# Patient Record
Sex: MALE | Race: OTHER | HISPANIC OR LATINO | URBAN - METROPOLITAN AREA
[De-identification: names, ages, dates, MRNs, and addresses within clinical notes are randomized per-mention and may not be internally consistent; named-entity substitution may affect disease eponyms.]

---

## 2024-11-01 ENCOUNTER — EMERGENCY (EMERGENCY)
Facility: HOSPITAL | Age: 44
LOS: 1 days | Discharge: HOME | End: 2024-11-01
Attending: STUDENT IN AN ORGANIZED HEALTH CARE EDUCATION/TRAINING PROGRAM
Payer: SELF-PAY

## 2024-11-01 VITALS
HEART RATE: 120 BPM | TEMPERATURE: 98 F | SYSTOLIC BLOOD PRESSURE: 140 MMHG | DIASTOLIC BLOOD PRESSURE: 96 MMHG | WEIGHT: 149.91 LBS | RESPIRATION RATE: 20 BRPM | OXYGEN SATURATION: 98 %

## 2024-11-01 PROCEDURE — 99284 EMERGENCY DEPT VISIT MOD MDM: CPT

## 2024-11-01 PROCEDURE — 96374 THER/PROPH/DIAG INJ IV PUSH: CPT

## 2024-11-01 PROCEDURE — 99285 EMERGENCY DEPT VISIT HI MDM: CPT | Mod: 25

## 2024-11-01 PROCEDURE — 82962 GLUCOSE BLOOD TEST: CPT

## 2024-11-01 PROCEDURE — T1013: CPT

## 2024-11-01 RX ORDER — ONDANSETRON HYDROCHLORIDE 2 MG/ML
4 INJECTION, SOLUTION INTRAMUSCULAR; INTRAVENOUS ONCE
Refills: 0 | Status: COMPLETED | OUTPATIENT
Start: 2024-11-01 | End: 2024-11-01

## 2024-11-01 RX ADMIN — ONDANSETRON HYDROCHLORIDE 4 MILLIGRAM(S): 2 INJECTION, SOLUTION INTRAMUSCULAR; INTRAVENOUS at 15:19

## 2024-11-01 NOTE — ED PROVIDER NOTE - PHYSICAL EXAMINATION
General: NAD, altered  Head: NC, AT  EENT: EOMI, no scleral icterus  Cardiac: RRR, no apparent murmurs, no lower extremity edema  Respiratory: CTABL, no respiratory distress on 2LNC  Abdomen: soft, ND, NT, nonperitonitic  MSK/Vascular: full ROM, soft compartments, warm extremities, no external lacerations or ecchymosis  Neuro: opens eye to voice, no focal deficits sensation to light touch intact  Psych: calm, cooperative General: NAD, altered  Head: NC, AT  EENT: 2mm pupils equal, EOMI, no scleral icterus  Cardiac: RRR, no apparent murmurs, no lower extremity edema  Respiratory: CTABL, no respiratory distress on 2LNC  Abdomen: soft, ND, NT, nonperitonitic  MSK/Vascular: full ROM, soft compartments, warm extremities, no external lacerations or ecchymosis, 2+ peripheral pulses b/l  Neuro: opens eye to voice, no focal deficits sensation to light touch intact

## 2024-11-01 NOTE — ED ADULT NURSE NOTE - CHIEF COMPLAINT QUOTE
Pt BIBA accompanied by [employer]687 for combative behavior.  As per report pt admitted to drinking alcohol.  Pt became combative, spitting at EMS.  Pt sedated upon arrival.  Pt responds to verbal stimuli.  Pt received 250mg ketamine, 5 mg Versed IM  from EMS PTA.  Pt moved to critical care.  Placed in yellow gown.  Belongings collected, labeled and secured.

## 2024-11-01 NOTE — ED PROVIDER NOTE - PATIENT PORTAL LINK FT
You can access the FollowMyHealth Patient Portal offered by [facility] by registering at the following website: [email or web address]. By joining [employer]’s FollowMyHealth portal, you will also be able to view your health information using other applications (apps) compatible with our system.

## 2024-11-01 NOTE — ED PROVIDER NOTE - PROGRESS NOTE DETAILS
Topher ATTG pt now awake, is able to recount and endorse that he was drinking etoh  will be dced soon mtf Topher ATTG pt now up and awake standinb on both feet upset his clothes are cut will dc

## 2024-11-01 NOTE — ED ADULT NURSE NOTE - NSFALLRISKINTERV_ED_ALL_ED
Assistance OOB with selected safe patient handling equipment if applicable/Assistance with ambulation/Communicate fall risk and risk factors to all staff, patient, and family/Encourage patient to sit up slowly, dangle for a short time, stand at bedside before walking/Monitor gait and stability/Monitor for mental status changes and reorient to person, place, and time, as needed/Provide visual cue: yellow wristband, yellow gown, etc/Reinforce activity limits and safety measures with patient and family/Review medications for side effects contributing to fall risk/Toileting schedule using arm’s reach rule for commode and bathroom/Use of alarms - bed, stretcher, chair and/or video monitoring/Bed in lowest position, wheels locked, appropriate side rails in place/Call bell, personal items and telephone in reach/Instruct patient to call for assistance before getting out of bed/chair/stretcher/Non-slip footwear applied when patient is off stretcher/[place] to call system/Physically safe environment - no spills, clutter or unnecessary equipment/Purposeful Proactive Rounding/Room/bathroom lighting operational, light cord in reach/Unable to comprehend

## 2024-11-01 NOTE — ED PROVIDER NOTE - NSFOLLOWUPINSTRUCTIONS_ED_ALL_ED_FT
Alcohol intoxication occurs when a person no longer thinks clearly or functions well after drinking alcohol. This is also referred to as becoming impaired. Intoxication can occur with just one drink. The legal definition of alcohol intoxication depends on the amount of alcohol in the blood (blood alcohol concentration, VITA). VITA of 80–100 mg/dL or higher is commonly considered legally intoxicated. The level of impairment depends on:  The amount of alcohol the person had.  The person's age, gender, and weight.  How often the person drinks.  Whether the person has other medical conditions, such as diabetes, seizures, or a heart condition.  Alcohol intoxication can range from mild to severe. The condition can be dangerous, especially if the person:  Also took certain drugs or prescription medicines.  Drinks a large amount of alcohol in a short period of time (binge drinks).  For women, binge drinking is having four or more drinks at one time.  For men, binge drinking is having five or more drinks at one time.  If you or anyone around you appears intoxicated, speak up and act.    What are the causes?  This condition is caused by drinking alcohol.    What increases the risk?  The following factors may make you more likely to develop this condition:  Peer pressure in young adults.  Difficulty managing stress.  History of drug or alcohol abuse.  Family history of drug or alcohol abuse.  Combining alcohol with drugs.  Low body weight.  Binge drinking.  What are the signs or symptoms?  Symptoms of alcohol intoxication can vary from person to person. Symptoms can be mild, moderate, or severe.    Symptoms of mild alcohol intoxication may include:  Feeling relaxed or sleepy.  Having mild difficulty with coordination, speech, memory, or attention.  Symptoms of moderate alcohol intoxication may include:  Strong anger or extreme sadness.  Moderate difficulty with coordination, speech, memory, or attention.  Symptoms of severe alcohol intoxication may include:  Severe difficulty with coordination, speech, memory, or attention.  Passing out.  Vomiting.  Confusion.  Slow breathing.  Coma.  Intoxication can change quickly from mild to severe. It can cause coma or death, especially in people who do not drink alcohol often.    How is this diagnosed?  Your health care provider will ask you how much alcohol you drank and what kind you had. Intoxication may also be diagnosed based on:  Your symptoms and medical history.  A physical exam.  A blood test that measures VITA.  A smell of alcohol on your breath.  How is this treated?  Treatment for alcohol intoxication may include:  Being monitored in an emergency department, hospital, or treatment center until your VITA comes down and it is safe for you to go home.  IV fluids to prevent or treat loss of fluid in the body (dehydration).  Medicine to treat nausea or vomiting or to get rid of alcohol in the body.  Counseling about the dangers of using alcohol.  Treatment for substance use disorder.  Oxygen therapy or a breathing machine (ventilator).  Drinking alcohol for a long time can have long-term effects on your brain, heart, and digestive system. These effects can be serious and may also require treatment.    Follow these instructions at home:  A sign showing that a person should not drive.  Eating and drinking    A 12-ounce bottle of beer, a 5-ounce glass of wine, and a 1.5-ounce shot of hard liquor.  Do not drink alcohol if:  Your health care provider tells you not to drink.  You are pregnant, may be pregnant, or are planning to become pregnant.  You are under the legal drinking age, or under 21 years old in the [place]  You are taking medicines that should not be taken with alcohol.  You have a medical condition, and alcohol makes it worse.  You need to drive or perform activities that require you to be alert.  You have substance use disorder.  Ask your health care provider if alcohol is safe for you. If your health care provider allows you to drink alcohol, limit how much you have to:  0–1 drink a day for women who are not pregnant.  0–2 drinks a day for men.  Know how much alcohol is in your drink. In the [place] one drink equals one 12 oz bottle of beer (355 mL), one 5 oz glass of wine (148 mL), or one 1½ oz glass of hard liquor (44 mL).  Avoid drinking alcohol on an empty stomach.  Alcohol increases urination. It is important to stay hydrated and avoid caffeine.  Avoid drinking more than one drink per hour.  When having multiple drinks, drink water or a non-alcoholic beverage between alcoholic drinks.  General instructions    Take over-the-counter and prescription medicines only as told by your health care provider.  Do not drive after drinking any amount of alcohol. Plan for a designated  or another way to go home.  Have someone responsible stay with you while you are intoxicated. You should notbe left alone.  Contact a health care provider if:  You do not feel better after a few days.  You have problems at work, at school, or at home due to drinking.  Get help right away if:  You have any of the following:  Trouble staying awake.  Moderate to severe trouble with coordination, speech, memory, or attention.  You are told you may have had a seizure.  Vomiting bright red blood or material that looks like coffee grounds.  Bloody stool (feces). The blood may make your stool bright red, black, or tarry.  These symptoms may be an emergency. Get help right away. Call 911.  Do not wait to see if the symptoms will go away.  Do not drive yourself to the hospital.  Also, get help right away if:  You have thoughts about hurting yourself or others.  Take one of these steps if you feel like you may hurt yourself or others, or have thoughts about taking your own life:  Call 911.  Call the National Suicide Prevention Lifeline at 1-211.819.8770 or 818. This is open 24 hours a day.  Text the Crisis Text Line at 784152.  Summary  Alcohol intoxication occurs when a person no longer thinks clearly or functions well after drinking alcohol.  Ask your health care provider if alcohol is safe for you. If your health care provider allows you to drink alcohol, limit how much you have.  Contact your health care provider if drinking has caused you problems at work, school, or home.  Get help right away if you have thoughts about hurting yourself or others.  This information is not intended to replace advice given to you by your health care provider. Make sure you discuss any questions you have with your health care provider.

## 2024-11-01 NOTE — ED ADULT NURSE REASSESSMENT NOTE - NS ED NURSE REASSESS COMMENT FT1
Report given to Chase werner 2 rn. Pt brought to B4L by charge JONATHAN Hogan. Pt remains in stable condition.

## 2024-11-01 NOTE — ED ADULT TRIAGE NOTE - CHIEF COMPLAINT QUOTE
Pt BIBA accompanied by [employer]666 for combative behavior.  As per report pt admitted to drinking alcohol.  Pt became combative, spitting at EMS.  Pt sedated upon arrival.  Pt responds to verbal stimuli.  Pt received 250mg ketamine, 5 mg Versed IM  from EMS PTA.  Pt moved to critical care.  Placed in yellow gown.  Belongings collected, labeled and secured.

## 2024-11-01 NOTE — ED PROVIDER NOTE - ATTENDING CONTRIBUTION TO CARE
I performed a history and physical exam of the patient and discussed their management with the resident. I reviewed the resident's note and agree with the documented findings and plan of care, except as noted.. My medical decision making and observations are found above.   pt unable to give any hx received ketamin and valium iv placed incase increased agitation

## 2024-11-01 NOTE — ED PROVIDER NOTE - OBJECTIVE STATEMENT
44-year-old male unknown past medical history brought in by EMS and [place] [employer] for acute agitation in the setting of alcohol intoxication.  Patient was sitting in [employer] all morning,  store employees called police and after initially cooperating with leaving the property subsequently returned after PD left.  After PD and EMS came patient then ran into traffic, stopped and handcuffed.  Acutely agitated, spitting and biting.  Given 5 mg IM Versed and then to 50 mg IM ketamine approximately 10 to 15 minutes prior to arrival.   reported to have drank 6 [place]'s. EMS fingerstick glucose greater than 200.

## 2024-11-01 NOTE — ED PROVIDER NOTE - CLINICAL SUMMARY MEDICAL DECISION MAKING FREE TEXT BOX
43 y/o male brought in for acute agitation in the setting of intoxication (6 [place]'s) requiring 250mg IM Ketamine and 5mg IM Versed. Breathing well on NC with EtCo2 in 30s. Arousable to voice but likely in dissociative state. No external signs of trauma, will observe for sobriety and reassess.

## 2024-11-01 NOTE — ED ADULT NURSE NOTE - OBJECTIVE STATEMENT
Pt received drowsy s/p medicated with 250mg IM ketamine and 5mg IM versed. Escorted by EMS and [employer] in cuffs. Pt unable to provide further information at this time. As per [employer], pt was at [employer] and became aggressive. Pt placed in yellow gown with belongings secured. FS obtained, see results. Pt placed on . Respirations even & unlabored. NAD. Pt safety maintained.

## 2025-01-12 ENCOUNTER — EMERGENCY (EMERGENCY)
Facility: HOSPITAL | Age: 45
LOS: 1 days | Discharge: HOME | End: 2025-01-12
Attending: STUDENT IN AN ORGANIZED HEALTH CARE EDUCATION/TRAINING PROGRAM
Payer: SELF-PAY

## 2025-01-12 VITALS — WEIGHT: 220.02 LBS

## 2025-01-12 LAB
BASOPHILS # BLD AUTO: 0.02 K/UL — SIGNIFICANT CHANGE UP (ref 0–0.2)
BASOPHILS NFR BLD AUTO: 0.2 % — SIGNIFICANT CHANGE UP (ref 0–2)
EOSINOPHIL # BLD AUTO: 0 K/UL — SIGNIFICANT CHANGE UP (ref 0–0.5)
EOSINOPHIL NFR BLD AUTO: 0 % — SIGNIFICANT CHANGE UP (ref 0–6)
HCT VFR BLD CALC: 44.2 % — SIGNIFICANT CHANGE UP (ref 39–50)
HGB BLD-MCNC: 15.7 G/DL — SIGNIFICANT CHANGE UP (ref 13–17)
IMM GRANULOCYTES NFR BLD AUTO: 0.3 % — SIGNIFICANT CHANGE UP (ref 0–0.9)
LYMPHOCYTES # BLD AUTO: 1.21 K/UL — SIGNIFICANT CHANGE UP (ref 1–3.3)
LYMPHOCYTES # BLD AUTO: 9.1 % — LOW (ref 13–44)
MCHC RBC-ENTMCNC: 30.1 PG — SIGNIFICANT CHANGE UP (ref 27–34)
MCHC RBC-ENTMCNC: 35.5 G/DL — SIGNIFICANT CHANGE UP (ref 32–36)
MCV RBC AUTO: 84.7 FL — SIGNIFICANT CHANGE UP (ref 80–100)
MONOCYTES # BLD AUTO: 0.5 K/UL — SIGNIFICANT CHANGE UP (ref 0–0.9)
MONOCYTES NFR BLD AUTO: 3.8 % — SIGNIFICANT CHANGE UP (ref 2–14)
NEUTROPHILS # BLD AUTO: 11.48 K/UL — HIGH (ref 1.8–7.4)
NEUTROPHILS NFR BLD AUTO: 86.6 % — HIGH (ref 43–77)
PLATELET # BLD AUTO: 200 K/UL — SIGNIFICANT CHANGE UP (ref 150–400)
RBC # BLD: 5.22 M/UL — SIGNIFICANT CHANGE UP (ref 4.2–5.8)
RBC # FLD: 11.9 % — SIGNIFICANT CHANGE UP (ref 10.3–14.5)
WBC # BLD: 13.25 K/UL — HIGH (ref 3.8–10.5)
WBC # FLD AUTO: 13.25 K/UL — HIGH (ref 3.8–10.5)

## 2025-01-12 PROCEDURE — 72125 CT NECK SPINE W/O DYE: CPT | Mod: 26

## 2025-01-12 PROCEDURE — 99285 EMERGENCY DEPT VISIT HI MDM: CPT

## 2025-01-12 PROCEDURE — 70450 CT HEAD/BRAIN W/O DYE: CPT | Mod: 26

## 2025-01-12 RX ORDER — MIDAZOLAM HYDROCHLORIDE 1 MG/ML
5 INJECTION INTRAMUSCULAR; INTRAVENOUS ONCE
Refills: 0 | Status: DISCONTINUED | OUTPATIENT
Start: 2025-01-12 | End: 2025-01-12

## 2025-01-12 RX ORDER — HALOPERIDOL DECANOATE 50 MG/ML
5 INJECTION INTRAMUSCULAR ONCE
Refills: 0 | Status: COMPLETED | OUTPATIENT
Start: 2025-01-12 | End: 2025-01-12

## 2025-01-12 RX ORDER — SODIUM CHLORIDE 9 MG/ML
1000 INJECTION, SOLUTION INTRAVENOUS ONCE
Refills: 0 | Status: COMPLETED | OUTPATIENT
Start: 2025-01-12 | End: 2025-01-12

## 2025-01-12 RX ORDER — LORAZEPAM 1 MG/1
2 TABLET ORAL ONCE
Refills: 0 | Status: DISCONTINUED | OUTPATIENT
Start: 2025-01-12 | End: 2025-01-12

## 2025-01-12 RX ORDER — DIPHENHYDRAMINE HCL 25 MG
50 TABLET ORAL ONCE
Refills: 0 | Status: COMPLETED | OUTPATIENT
Start: 2025-01-12 | End: 2025-01-12

## 2025-01-12 RX ORDER — MIDAZOLAM HYDROCHLORIDE 1 MG/ML
2 INJECTION INTRAMUSCULAR; INTRAVENOUS ONCE
Refills: 0 | Status: DISCONTINUED | OUTPATIENT
Start: 2025-01-12 | End: 2025-01-12

## 2025-01-12 RX ORDER — SODIUM CHLORIDE 9 MG/ML
1000 INJECTION, SOLUTION INTRAMUSCULAR; INTRAVENOUS; SUBCUTANEOUS ONCE
Refills: 0 | Status: COMPLETED | OUTPATIENT
Start: 2025-01-12 | End: 2025-01-12

## 2025-01-12 RX ADMIN — MIDAZOLAM HYDROCHLORIDE 2 MILLIGRAM(S): 1 INJECTION INTRAMUSCULAR; INTRAVENOUS at 22:15

## 2025-01-12 RX ADMIN — Medication 50 MILLIGRAM(S): at 19:11

## 2025-01-12 RX ADMIN — LORAZEPAM 2 MILLIGRAM(S): 1 TABLET ORAL at 19:11

## 2025-01-12 RX ADMIN — SODIUM CHLORIDE 1000 MILLILITER(S): 9 INJECTION, SOLUTION INTRAVENOUS at 23:19

## 2025-01-12 RX ADMIN — MIDAZOLAM HYDROCHLORIDE 5 MILLIGRAM(S): 1 INJECTION INTRAMUSCULAR; INTRAVENOUS at 20:05

## 2025-01-12 RX ADMIN — MIDAZOLAM HYDROCHLORIDE 2 MILLIGRAM(S): 1 INJECTION INTRAMUSCULAR; INTRAVENOUS at 20:05

## 2025-01-12 RX ADMIN — SODIUM CHLORIDE 1000 MILLILITER(S): 9 INJECTION, SOLUTION INTRAMUSCULAR; INTRAVENOUS; SUBCUTANEOUS at 21:50

## 2025-01-12 RX ADMIN — HALOPERIDOL DECANOATE 5 MILLIGRAM(S): 50 INJECTION INTRAMUSCULAR at 19:11

## 2025-01-12 NOTE — ED PROVIDER NOTE - ATTENDING CONTRIBUTION TO CARE
I, Marin Downey MD, personally saw the patient with the resident, and completed the key components of the history and physical exam. I then discussed the management plan with the resident.  Patient brought in by EMS with concern for intoxicated behavior and being aggressive with people outside of a grocery store.  Patient unable to provide additional history at this time due to his aggressive behavior.  On exam he is yelling and spitting at people.  Appears intoxicated.  Does have abrasion to his head and old abrasions to his knees but no other trauma noted on exam.  He is found to be tachycardic here.  Concern for acute alcohol intoxication causing his agitated behavior.  With abrasion to head, will obtain CT head and cervical spine to evaluate for trauma.  Patient hypotensive here but these vital signs were taken after received multiple medications to assist with sedation given his agitated behavior.  Will resuscitate with IV fluids and reevaluate patient.

## 2025-01-12 NOTE — ED PROVIDER NOTE - PATIENT PORTAL LINK FT
You can access the FollowMyHealth Patient Portal offered by [facility] by registering at the following website: [email or web address]. By joining [employer]’s FollowMyHealth portal, you will also be able to view your health information using other applications (apps) compatible with our system.

## 2025-01-12 NOTE — ED ADULT TRIAGE NOTE - CHIEF COMPLAINT QUOTE
BIBEMS with [employer] after being found outside a super market being aggressive and violent. +ETOH. PT with lacertion to forehead. Security and ED attending called to bedside

## 2025-01-12 NOTE — ED PROVIDER NOTE - CLINICAL SUMMARY MEDICAL DECISION MAKING FREE TEXT BOX
01-Jan-2018 19:00
45-year-old male with past medical history of alcohol use disorder brought in by EMS by [facility] for aggressive behavior likely in the setting of acute alcohol intoxication outside of a grocery store.  Patient is agitated, hostile towards staff, attempting to spit on others and is not able to be redirected.  Requiring medications for management of agitation including Haldol, Ativan, Versed, and Benadryl.  Priority CT head called after agitation managed. No lacerations requiring intervention at this time.

## 2025-01-12 NOTE — ED PROVIDER NOTE - PHYSICAL EXAMINATION
General: aggressive, intoxicated  Head: NC, abrasion over forehead, no obvious lacerations  EENT: L periorbital ecchymosis without proptosis, EOMI, no scleral icterus  Cardiac: Tachy but regular, no apparent murmurs, no lower extremity edema  Respiratory: CTABL, no respiratory distress   Abdomen: soft, ND, NT, nonperitonitic  MSK/Vascular: full ROM, soft compartments, warm extremities, old b/l knee abrasions  Neuro: awake and alert, GCS 14, no focal deficits  Psych: agitated, hostile towards staff

## 2025-01-12 NOTE — ED PROVIDER NOTE - OBJECTIVE STATEMENT
45-year-old male (real name Ari Hanks  1980)  brought in by EMS by [facility] after reportedly being intoxicated and physically aggressive with others outside of a grocery store.  Patient not redirectable, attempting to spit on staff members and police officers, has some bleeding/abrasions on his scalp.

## 2025-01-12 NOTE — ED ADULT NURSE NOTE - OBJECTIVE STATEMENT
Patient presents to ED accompanied by [employer] officers, patient aggressive and  agitated, as per EMS and [employer] officer patient was reported to be aggressive toward shoppers at local supermarket, patient currently aggressive and agitated, placed in critical care area for safety, security at the bedside, laceration on forehead with mild bleeding, resp even/unlabored.

## 2025-01-12 NOTE — ED PROVIDER NOTE - PROGRESS NOTE DETAILS
Patient developed hypotension while in the ER but slowly improving with fluids.  Given that there is no other external evidence of trauma, as well as his tachycardia improving, I suspect this is more likely related to multiple sedative medications that he received rather than in the setting of trauma.  Hemoglobin 15.7.  Will continue to monitor vital signs and reassess for clinical sobriety.  Marin Downey MD. Called to bedside as patient is more awake, but noted to be irritable and not following instructions. On re-assessment with  Bladimir: patient is awake but states he just want to sleep. Patient not sure how he got toe Ed and refusing to answer additional questions, as er covered his head. General examination patient noted to moving all extremities with normal breath sounds and cardiac examination.  Labs are as pending. BP has improved, The hypotension likely secondary to medications given earlier. Called to be bedside as patient attempted to get into a chair near his bed. Mr. Riggs,  Staff witnessed the patient falling and landing on his buttocks. There was no head and neck injury. Patient not answering questions at this time. Blood noted on clothing and floor and appears secondary to IV being forcibly displaced when he fell. No obvious trauma and patient in no distress. Called to be bedside as patient attempted to get into a chair near his bed. Mr. Riggs,  Staff witnessed the patient falling and landing on his buttocks. There was no head and neck injury. Patient not answering questions at this time. Blood noted on clothing and floor and appears secondary to IV being forcibly displaced when he fell. No obvious trauma and patient in no distress.  Labs are as note. Patient refusing to cooperate for EKG.

## 2025-01-12 NOTE — ED PROVIDER NOTE - NSFOLLOWUPINSTRUCTIONS_ED_ALL_ED_FT
Intoxicación alcohólica  Alcohol Intoxication    La intoxicación alcohólica ocurre cuando dee persona ya no piensa con claridad ni se desempeña saniya (experimenta un deterioro) después de consumir bebidas alcohólicas. La intoxicación alcohólica puede ocurrir tan solo con dee copa. El definición legal de la intoxicación alcohólica depende de la cantidad de alcohol en la cate (concentración de alcohol en la cate, MARLO). Dee MARLO de 80 a 100 mg/dl o mayor se considera legalmente stephanie dee intoxicación alcohólica. El nivel de deterioro depende de lo siguiente:    La cantidad de alcohol que la persona bebió.  La edad, el sexo y el peso de la persona.  La frecuencia con la que la persona amber.  Si la persona tiene otras enfermedades, tales stephanie diabetes, convulsiones o dee afección cardíaca.    La intoxicación alcohólica puede variar de leve a grave. La afección puede ser peligrosa, especialmente si la persona:    También usa ciertas drogas ilegales y medicamentos recetados.  Amber dee gran cantidad de alcohol en un periodo corto de tiempo (consume alcohol compulsivamente).    En las mujeres, el consumo de alcohol compulsivo significa beber cuatro o más medidas de alcohol a la vez.  En los hombres, el consumo de alcohol compulsivo significa beber gabby o más medidas de alcohol a la vez.    Si usted o alguien a franks alrededor parece estar embriagado, diga algo y actúe.    ¿Cuáles son las causas?  La causa de esta afección es el consumo de alcohol.    ¿Qué incrementa el riesgo?  Los siguientes factores pueden hacer que sea más propenso a contraer esta afección:    Presión de los pares en los adultos jóvenes.  Dificultad para manejar el estrés.  Antecedentes de consumo excesivo de drogas o alcohol.  Combinación de alcohol con drogas.  Antecedentes familiares de consumo excesivo de alcohol o drogas.  Peso corporal bajo.  Consumo de alcohol compulsivo.    ¿Cuáles son los signos o síntomas?  Los síntomas de la intoxicación alcohólica pueden variar de dee persona a otra. Los síntomas pueden ser leves, moderados o graves.    Los síntomas de dee intoxicación alcohólica leve pueden incluir los siguientes:    Sensación de relajación o somnolencia.  Tener dee leve dificultad con la coordinación, el habla, la memoria o la atención.    Los síntomas de dee intoxicación alcohólica moderada pueden incluir los siguientes:    Emociones extremas, stephanie enojo o tristeza.  Tener dificultad con la coordinación, el habla, la memoria o la atención.    Los síntomas de dee intoxicación alcohólica grave pueden incluir los siguientes:    Tener dee seria dificultad con la coordinación, el habla, la memoria o la atención.  Desmayo.  Vómitos.  Confusión.  Respiración lenta.  Coma.    La intoxicación alcohólica puede cambiar rápidamente de leve a grave. Puede causar coma o la muerte, especialmente en las personas que no están expuestas al alcohol con frecuencia.    ¿Cómo se diagnostica?  El médico le preguntará la cantidad y el tipo de bebida alcohólica que bebió. La intoxicación también puede diagnosticarse en función de:    Los síntomas y los antecedentes médicos.  Un examen físico.  Un análisis de cate que mide la MARLO.  El olor a alcohol en la respiración.    ¿Cómo se trata?  El tratamiento para la intoxicación por alcohol puede incluir:    Ser controlado en un departamento de emergencias, hospital o centro de tratamiento hasta que la MARLO disminuya y sea seguro para usted regresar a franks casa.  Líquidos intravenosos (i.v.) para prevenir o tratar la pérdida de líquido en el cuerpo (deshidratación).  Medicamentos para tratar las náuseas o los vómitos, o para eliminar el alcohol del cuerpo.  Asesoramiento (intervención breve) sobre los peligros de consumir alcohol.  Tratamiento para el trastorno por el consumo de sustancias.  Oxigenoterapia o dee máquina para respirar (respirador).    La exposición a dina plazo (crónica) al alcohol puede tener efectos a dina plazo en el cerebro, el corazón y el sistema digestivo. Estos efectos pueden ser graves y pueden requerir tratamiento.    Siga estas indicaciones en franks casa:         Comida y bebida     No monika alcohol si:    Franks médico le indica no hacerlo.  Está embarazada, puede estar embarazada o está tratando de quedar embarazada.  No tiene la edad legal para beber (mayor de 21 años de edad en los [place]).  Está tomando medicamentos que no se deben juan francisco con alcohol.  Tiene dee afección médica y el alcohol la empeora.  Tiene que conducir o realizar actividades que requieren que esté alerta.  Tiene un trastorno por abuso de sustancias.  Pregúntele al médico si el alcohol es seguro para usted. Si el médico le permite beber alcohol, limite la cantidad que kamilah. Puede beber:    De 0 a 1 medida por día para las mujeres.   De 0 a 2 medidas por día para los hombres.     Esté atento a la cantidad de alcohol que hay en las bebidas que kamilah. En los [place], dee medida equivale a dee botella de cerveza de 12 oz (355 ml), un vaso de vino de 5 oz (148 ml) o un vaso de dee bebida alcohólica de humaira graduación de 1½ oz (44 ml).  Evite beber alcohol con el estómago vacío.  Manténgase hidratado. Monika suficiente líquido para mantener la orina de color amarillo pálido. Evite la cafeína, ya que puede deshidratarlo.  Evite consumir más de dee bebida alcohólica por hora.  Cuando monika más que dee medida de alcohol, monika agua o dee bebida sin alcohol entre las bebidas alcohólicas.        Indicaciones generales    Carlyss los medicamentos de venta angélica y los recetados solamente stephanie se lo haya indicado el médico.  No conduzca después de beber cualquier cantidad de alcohol. Organice un conductor designado u otra forma de volver a casa.  Pídale a alguna persona responsable que se quede con usted mientras está embriagado. No debería quedarse solo.  Concurra a todas las visitas de seguimiento stephanie se lo haya indicado el médico. [place] es importante.    Comuníquese con un médico si:  No mejora luego de algunos días.  Tiene problemas en el trabajo, la escuela o el hogar debido al consumo de alcohol.    Solicite ayuda de inmediato si:  Tiene alguno de los siguientes síntomas:    Grave dificultad con la coordinación, el habla, la memoria o la atención.  Dificultad para mantenerse despierto.  Confusión grave.  Dee convulsión.  Desvanecimiento.  Desmayos.  Vómitos con cate de color alfaro brillante o dee sustancia parecida a la borra del café.  Tiene cate en la materia fecal (heces). La cate puede hacer que las heces tengan color alfaro brillante, color diogo o aspecto alquitranado. También pueden tener mal olor.  Temblores al tratar de abandonar el hábito de beber.  Pensamientos acerca de lastimarse a sí mismo o a otras personas.    Si alguna vez siente que puede lastimarse o lastimar a otras personas, o tiene pensamientos de poner fin a franks allie, busque ayuda de inmediato. Puede dirigirse al servicio de emergencias más cercano o comunicarse con:    El servicio de emergencias de franks localidad (911 en [place]  Dee línea de asistencia al suicida y atención en crisis, stephanie National Suicide Prevention Lifeline (Línea Nacional de Prevención del Suicidio), al 1-227.350.2892. Está disponible las 24 horas del día.    Resumen  La intoxicación alcohólica ocurre cuando dee persona ya no piensa con claridad ni se desempeña saniya después de consumir bebidas alcohólicas.  Si el médico le dice que no corre riesgos al beber alcohol, limite franks consumo a no más de 1 medida al día si es ezekiel (no monika si está embarazada) y 2 medidas si es hombre. Dee medida equivale a 12 onzas de cerveza, 5 onzas de vino o 1½ onzas de bebidas alcohólicas de humaira graduación.  Comuníquese con el médico si el consumo de alcohol le ha causado problemas en el trabajo, en la escuela o en franks casa.  Busque ayuda de inmediato si tiene pensamientos acerca de lastimarse a usted mismo o a otras personas.    NOTAS ADICIONALES E INSTRUCCIONES    Please follow up with your Primary MD in 24-48 hr.  Seek immediate medical care for any new/worsening signs or symptoms.

## 2025-01-13 VITALS
RESPIRATION RATE: 20 BRPM | DIASTOLIC BLOOD PRESSURE: 55 MMHG | HEART RATE: 100 BPM | SYSTOLIC BLOOD PRESSURE: 105 MMHG | OXYGEN SATURATION: 94 % | TEMPERATURE: 98 F

## 2025-01-13 LAB
ALBUMIN SERPL ELPH-MCNC: 3.6 G/DL — SIGNIFICANT CHANGE UP (ref 3.3–5.2)
ALP SERPL-CCNC: 98 U/L — SIGNIFICANT CHANGE UP (ref 40–120)
ALT FLD-CCNC: 20 U/L — SIGNIFICANT CHANGE UP
ANION GAP SERPL CALC-SCNC: 13 MMOL/L — SIGNIFICANT CHANGE UP (ref 5–17)
AST SERPL-CCNC: 38 U/L — SIGNIFICANT CHANGE UP
BILIRUB SERPL-MCNC: 0.7 MG/DL — SIGNIFICANT CHANGE UP (ref 0.4–2)
BLD GP AB SCN SERPL QL: SIGNIFICANT CHANGE UP
BUN SERPL-MCNC: 12.1 MG/DL — SIGNIFICANT CHANGE UP (ref 8–20)
CALCIUM SERPL-MCNC: 7.7 MG/DL — LOW (ref 8.4–10.5)
CHLORIDE SERPL-SCNC: 104 MMOL/L — SIGNIFICANT CHANGE UP (ref 96–108)
CO2 SERPL-SCNC: 20 MMOL/L — LOW (ref 22–29)
CREAT SERPL-MCNC: 1.06 MG/DL — SIGNIFICANT CHANGE UP (ref 0.5–1.3)
EGFR: 89 ML/MIN/1.73M2 — SIGNIFICANT CHANGE UP
ETHANOL SERPL-MCNC: 193 MG/DL — HIGH (ref 0–9)
GAS PNL BLDV: SIGNIFICANT CHANGE UP
GLUCOSE SERPL-MCNC: 108 MG/DL — HIGH (ref 70–99)
POTASSIUM SERPL-MCNC: 5.3 MMOL/L — SIGNIFICANT CHANGE UP (ref 3.5–5.3)
POTASSIUM SERPL-SCNC: 5.3 MMOL/L — SIGNIFICANT CHANGE UP (ref 3.5–5.3)
PROT SERPL-MCNC: 6.1 G/DL — LOW (ref 6.6–8.7)
SODIUM SERPL-SCNC: 137 MMOL/L — SIGNIFICANT CHANGE UP (ref 135–145)
TROPONIN T, HIGH SENSITIVITY RESULT: 6 NG/L — SIGNIFICANT CHANGE UP (ref 0–51)

## 2025-01-13 PROCEDURE — 85610 PROTHROMBIN TIME: CPT

## 2025-01-13 PROCEDURE — 82435 ASSAY OF BLOOD CHLORIDE: CPT

## 2025-01-13 PROCEDURE — 82803 BLOOD GASES ANY COMBINATION: CPT

## 2025-01-13 PROCEDURE — 82330 ASSAY OF CALCIUM: CPT

## 2025-01-13 PROCEDURE — 80307 DRUG TEST PRSMV CHEM ANLYZR: CPT

## 2025-01-13 PROCEDURE — 93005 ELECTROCARDIOGRAM TRACING: CPT

## 2025-01-13 PROCEDURE — 85730 THROMBOPLASTIN TIME PARTIAL: CPT

## 2025-01-13 PROCEDURE — 86850 RBC ANTIBODY SCREEN: CPT

## 2025-01-13 PROCEDURE — 84295 ASSAY OF SERUM SODIUM: CPT

## 2025-01-13 PROCEDURE — T1013: CPT

## 2025-01-13 PROCEDURE — 85018 HEMOGLOBIN: CPT

## 2025-01-13 PROCEDURE — 82947 ASSAY GLUCOSE BLOOD QUANT: CPT

## 2025-01-13 PROCEDURE — 85025 COMPLETE CBC W/AUTO DIFF WBC: CPT

## 2025-01-13 PROCEDURE — 84132 ASSAY OF SERUM POTASSIUM: CPT

## 2025-01-13 PROCEDURE — 80053 COMPREHEN METABOLIC PANEL: CPT

## 2025-01-13 PROCEDURE — 96374 THER/PROPH/DIAG INJ IV PUSH: CPT

## 2025-01-13 PROCEDURE — 96372 THER/PROPH/DIAG INJ SC/IM: CPT | Mod: XU

## 2025-01-13 PROCEDURE — 86900 BLOOD TYPING SEROLOGIC ABO: CPT

## 2025-01-13 PROCEDURE — 83605 ASSAY OF LACTIC ACID: CPT

## 2025-01-13 PROCEDURE — 85014 HEMATOCRIT: CPT

## 2025-01-13 PROCEDURE — 93010 ELECTROCARDIOGRAM REPORT: CPT

## 2025-01-13 PROCEDURE — 36415 COLL VENOUS BLD VENIPUNCTURE: CPT

## 2025-01-13 PROCEDURE — 86901 BLOOD TYPING SEROLOGIC RH(D): CPT

## 2025-01-13 PROCEDURE — 84484 ASSAY OF TROPONIN QUANT: CPT

## 2025-01-13 PROCEDURE — 99285 EMERGENCY DEPT VISIT HI MDM: CPT | Mod: 25

## 2025-01-13 PROCEDURE — 70450 CT HEAD/BRAIN W/O DYE: CPT | Mod: MC

## 2025-01-13 PROCEDURE — 72125 CT NECK SPINE W/O DYE: CPT | Mod: MC

## 2025-01-13 PROCEDURE — 83735 ASSAY OF MAGNESIUM: CPT

## 2025-01-13 RX ORDER — SODIUM CHLORIDE 9 MG/ML
1000 INJECTION, SOLUTION INTRAVENOUS ONCE
Refills: 0 | Status: COMPLETED | OUTPATIENT
Start: 2025-01-13 | End: 2025-01-13

## 2025-01-13 RX ADMIN — SODIUM CHLORIDE 1000 MILLILITER(S): 9 INJECTION, SOLUTION INTRAVENOUS at 01:44

## 2025-01-13 NOTE — ED ADULT NURSE REASSESSMENT NOTE - NS ED NURSE REASSESS COMMENT FT1
Assumed care of pt from, previous RN. A&Ox4, RR even and unlabored. Skin is warm and dry. PT belongings returned. Vs WNL.  Zachary used.
Patient awake and drowsy,  attempting to get out of bed despite encouragement to stay in bed. MD Alba to bedside.  at bedside, patient states he " wants to sleep."
Patient removing cardiac monitor leads and spo2, despite encouragement, patient being non-complaint with monitoring system
Report received from Valerio RN at 2230. Pt placed on pulse ox, patient noted to be hypotensive, inserted IV and started NS 1000ml bolus as per verbal order from Dr Mays
delay in vitals due to aggressive behavior
pt arrived handcuffed by PD and with spit mask in place very aggressive with staff delay in care due to behavior pt currently resting in strecther awaiting CT
Patient fell attempting to get OOB to chair, patient did no headstrike, no apparent injuries. MD Alba at bedside

## 2025-01-13 NOTE — ED ADULT NURSE REASSESSMENT NOTE - NSFALLRISKINTERV_ED_ALL_ED
Assistance OOB with selected safe patient handling equipment if applicable/Assistance with ambulation/Communicate fall risk and risk factors to all staff, patient, and family/Monitor gait and stability/Monitor for mental status changes and reorient to person, place, and time, as needed/Provide visual cue: yellow wristband, yellow gown, etc/Reinforce activity limits and safety measures with patient and family/Toileting schedule using arm’s reach rule for commode and bathroom/Use of alarms - bed, stretcher, chair and/or video monitoring/Call bell, personal items and telephone in reach/Instruct patient to call for assistance before getting out of bed/chair/stretcher/Non-slip footwear applied when patient is off stretcher/[place] to call system/Physically safe environment - no spills, clutter or unnecessary equipment/Purposeful Proactive Rounding/Room/bathroom lighting operational, light cord in reach

## 2025-01-20 ENCOUNTER — EMERGENCY (EMERGENCY)
Facility: HOSPITAL | Age: 45
LOS: 1 days | Discharge: HOME | End: 2025-01-20
Attending: STUDENT IN AN ORGANIZED HEALTH CARE EDUCATION/TRAINING PROGRAM
Payer: SELF-PAY

## 2025-01-20 VITALS
DIASTOLIC BLOOD PRESSURE: 57 MMHG | OXYGEN SATURATION: 96 % | TEMPERATURE: 98 F | HEART RATE: 110 BPM | RESPIRATION RATE: 17 BRPM | SYSTOLIC BLOOD PRESSURE: 91 MMHG

## 2025-01-20 LAB
ALBUMIN SERPL ELPH-MCNC: 3.9 G/DL — SIGNIFICANT CHANGE UP (ref 3.3–5.2)
ALP SERPL-CCNC: 113 U/L — SIGNIFICANT CHANGE UP (ref 40–120)
ALT FLD-CCNC: 23 U/L — SIGNIFICANT CHANGE UP
ANION GAP SERPL CALC-SCNC: 13 MMOL/L — SIGNIFICANT CHANGE UP (ref 5–17)
AST SERPL-CCNC: 25 U/L — SIGNIFICANT CHANGE UP
BASOPHILS # BLD AUTO: 0.04 K/UL — SIGNIFICANT CHANGE UP (ref 0–0.2)
BASOPHILS NFR BLD AUTO: 0.6 % — SIGNIFICANT CHANGE UP (ref 0–2)
BILIRUB SERPL-MCNC: 0.8 MG/DL — SIGNIFICANT CHANGE UP (ref 0.4–2)
BUN SERPL-MCNC: 11.9 MG/DL — SIGNIFICANT CHANGE UP (ref 8–20)
CALCIUM SERPL-MCNC: 8.4 MG/DL — SIGNIFICANT CHANGE UP (ref 8.4–10.5)
CHLORIDE SERPL-SCNC: 104 MMOL/L — SIGNIFICANT CHANGE UP (ref 96–108)
CO2 SERPL-SCNC: 23 MMOL/L — SIGNIFICANT CHANGE UP (ref 22–29)
CREAT SERPL-MCNC: 1.01 MG/DL — SIGNIFICANT CHANGE UP (ref 0.5–1.3)
EGFR: 91 ML/MIN/1.73M2 — SIGNIFICANT CHANGE UP
EOSINOPHIL # BLD AUTO: 0.03 K/UL — SIGNIFICANT CHANGE UP (ref 0–0.5)
EOSINOPHIL NFR BLD AUTO: 0.5 % — SIGNIFICANT CHANGE UP (ref 0–6)
GLUCOSE SERPL-MCNC: 110 MG/DL — HIGH (ref 70–99)
HCT VFR BLD CALC: 45.8 % — SIGNIFICANT CHANGE UP (ref 39–50)
HGB BLD-MCNC: 16.7 G/DL — SIGNIFICANT CHANGE UP (ref 13–17)
IMM GRANULOCYTES NFR BLD AUTO: 0.5 % — SIGNIFICANT CHANGE UP (ref 0–0.9)
LACTATE SERPL-SCNC: 2.6 MMOL/L — HIGH (ref 0.5–2)
LACTATE SERPL-SCNC: 4.4 MMOL/L — CRITICAL HIGH (ref 0.5–2)
LYMPHOCYTES # BLD AUTO: 1.9 K/UL — SIGNIFICANT CHANGE UP (ref 1–3.3)
LYMPHOCYTES # BLD AUTO: 29.1 % — SIGNIFICANT CHANGE UP (ref 13–44)
MCHC RBC-ENTMCNC: 30.9 PG — SIGNIFICANT CHANGE UP (ref 27–34)
MCHC RBC-ENTMCNC: 36.5 G/DL — HIGH (ref 32–36)
MCV RBC AUTO: 84.8 FL — SIGNIFICANT CHANGE UP (ref 80–100)
MONOCYTES # BLD AUTO: 0.47 K/UL — SIGNIFICANT CHANGE UP (ref 0–0.9)
MONOCYTES NFR BLD AUTO: 7.2 % — SIGNIFICANT CHANGE UP (ref 2–14)
NEUTROPHILS # BLD AUTO: 4.07 K/UL — SIGNIFICANT CHANGE UP (ref 1.8–7.4)
NEUTROPHILS NFR BLD AUTO: 62.1 % — SIGNIFICANT CHANGE UP (ref 43–77)
PLATELET # BLD AUTO: 245 K/UL — SIGNIFICANT CHANGE UP (ref 150–400)
POTASSIUM SERPL-MCNC: 4 MMOL/L — SIGNIFICANT CHANGE UP (ref 3.5–5.3)
POTASSIUM SERPL-SCNC: 4 MMOL/L — SIGNIFICANT CHANGE UP (ref 3.5–5.3)
PROT SERPL-MCNC: 6.5 G/DL — LOW (ref 6.6–8.7)
RBC # BLD: 5.4 M/UL — SIGNIFICANT CHANGE UP (ref 4.2–5.8)
RBC # FLD: 12.1 % — SIGNIFICANT CHANGE UP (ref 10.3–14.5)
SODIUM SERPL-SCNC: 139 MMOL/L — SIGNIFICANT CHANGE UP (ref 135–145)
WBC # BLD: 6.54 K/UL — SIGNIFICANT CHANGE UP (ref 3.8–10.5)
WBC # FLD AUTO: 6.54 K/UL — SIGNIFICANT CHANGE UP (ref 3.8–10.5)

## 2025-01-20 PROCEDURE — 93010 ELECTROCARDIOGRAM REPORT: CPT

## 2025-01-20 PROCEDURE — 99285 EMERGENCY DEPT VISIT HI MDM: CPT

## 2025-01-20 PROCEDURE — 70450 CT HEAD/BRAIN W/O DYE: CPT | Mod: 26

## 2025-01-20 RX ORDER — HALOPERIDOL DECANOATE 50 MG/ML
5 INJECTION INTRAMUSCULAR ONCE
Refills: 0 | Status: COMPLETED | OUTPATIENT
Start: 2025-01-20 | End: 2025-01-20

## 2025-01-20 RX ORDER — DIPHENHYDRAMINE HCL 25 MG
50 TABLET ORAL ONCE
Refills: 0 | Status: COMPLETED | OUTPATIENT
Start: 2025-01-20 | End: 2025-01-20

## 2025-01-20 RX ORDER — MIDAZOLAM HYDROCHLORIDE 1 MG/ML
7.5 INJECTION INTRAMUSCULAR; INTRAVENOUS ONCE
Refills: 0 | Status: DISCONTINUED | OUTPATIENT
Start: 2025-01-20 | End: 2025-01-20

## 2025-01-20 RX ORDER — SODIUM CHLORIDE 9 MG/ML
1000 INJECTION, SOLUTION INTRAMUSCULAR; INTRAVENOUS; SUBCUTANEOUS ONCE
Refills: 0 | Status: DISCONTINUED | OUTPATIENT
Start: 2025-01-20 | End: 2025-01-28

## 2025-01-20 RX ORDER — LORAZEPAM 1 MG/1
2 TABLET ORAL ONCE
Refills: 0 | Status: DISCONTINUED | OUTPATIENT
Start: 2025-01-20 | End: 2025-01-20

## 2025-01-20 RX ORDER — SODIUM CHLORIDE 9 MG/ML
1000 INJECTION, SOLUTION INTRAMUSCULAR; INTRAVENOUS; SUBCUTANEOUS ONCE
Refills: 0 | Status: COMPLETED | OUTPATIENT
Start: 2025-01-20 | End: 2025-01-20

## 2025-01-20 RX ADMIN — HALOPERIDOL DECANOATE 5 MILLIGRAM(S): 50 INJECTION INTRAMUSCULAR at 12:44

## 2025-01-20 RX ADMIN — LORAZEPAM 2 MILLIGRAM(S): 1 TABLET ORAL at 12:44

## 2025-01-20 RX ADMIN — Medication 50 MILLIGRAM(S): at 12:44

## 2025-01-20 RX ADMIN — LORAZEPAM 2 MILLIGRAM(S): 1 TABLET ORAL at 12:50

## 2025-01-20 RX ADMIN — MIDAZOLAM HYDROCHLORIDE 7.5 MILLIGRAM(S): 1 INJECTION INTRAMUSCULAR; INTRAVENOUS at 13:00

## 2025-01-20 RX ADMIN — SODIUM CHLORIDE 1000 MILLILITER(S): 9 INJECTION, SOLUTION INTRAMUSCULAR; INTRAVENOUS; SUBCUTANEOUS at 13:32

## 2025-01-20 NOTE — ED PROVIDER NOTE - PROGRESS NOTE3
Stable. Review of Systems:  CONSTITUTIONAL: No fever  SKIN: No rash  HEMATOLOGIC: No abnormal bleeding   EYES: No eye pain, No blurred vision  ENT: No sore throat, No neck pain   RESPIRATORY: No shortness of breath, No cough  CARDIAC: No chest pain, No palpitations  GI: No abdominal pain, No nausea, No vomiting   : No dysuria, frequency, hematuria.   MUSCULOSKELETAL: No joint paint, No swelling, No back pain  NEUROLOGIC: No numbness, No focal weakness, No headache, No dizziness  PSYCH: Patient denies SI/HI, denies any self-harm attempt or OD, and denies AV hallucinations.   All other systems negative, unless specified in HPI

## 2025-01-20 NOTE — ED ADULT NURSE NOTE - OBJECTIVE STATEMENT
pt arrives w/ [employer] agitated, aggressive and combative with staff and ems. pt unable to be directed despite multiple de escalation techqnies. md at bedside, pt medicated for safety. pt changed into yellow gown. placed on cardiac monitor and . pt under arrest w/ [employer]

## 2025-01-20 NOTE — ED ADULT NURSE NOTE - NS ED NURSE IV DC DT

## 2025-01-20 NOTE — ED PROVIDER NOTE - PATIENT PORTAL LINK FT
You can access the FollowMyHealth Patient Portal offered by [facility] by registering at the following website: [email or web address]. By joining [facility]’s FollowMyHealth portal, you will also be able to view your health information using other applications (apps) compatible with our system.

## 2025-01-20 NOTE — ED ADULT NURSE NOTE - NSFALLRISKINTERV_ED_ALL_ED
Assistance OOB with selected safe patient handling equipment if applicable/Communicate fall risk and risk factors to all staff, patient, and family/Encourage patient to sit up slowly, dangle for a short time, stand at bedside before walking/Provide visual cue: yellow wristband, yellow gown, etc/Reinforce activity limits and safety measures with patient and family/Review medications for side effects contributing to fall risk/Toileting schedule using arm’s reach rule for commode and bathroom/Call bell, personal items and telephone in reach/Instruct patient to call for assistance before getting out of bed/chair/stretcher/Non-slip footwear applied when patient is off stretcher/[place] to call system/Physically safe environment - no spills, clutter or unnecessary equipment/Purposeful Proactive Rounding/Room/bathroom lighting operational, light cord in reach

## 2025-01-20 NOTE — ED ADULT NURSE NOTE - NS ED NOTE  TALK SOMEONE YN
No GEN: Pt in NAD, well appearing, A&O x3.  PSYCH: Affect appropriate.  HEAD: Head NCAT.  Neck supple FROM  EYES: Sclera white w/o injection. PERRLA, EOMI.  ENT: No nasal d/c; sinuses non-ttp; uvula midline, no exudates, MMM.  RESP: b/l crackles w/ expiratory wheezing.  CARDIAC: RRR, clear distinct S1, S2, no appreciable murmurs.  ABD: Abdomen soft, non-tender. No CVAT b/l.  VASC: 2+ radial pulses equal b/l. No edema or calf tenderness.  SKIN: No rashes, lesions, or ecchymoses on the trunk.

## 2025-01-20 NOTE — ED PROVIDER NOTE - CLINICAL SUMMARY MEDICAL DECISION MAKING FREE TEXT BOX
Jayceejackson ATTG   51-year-old male unknown name chief complaint of altered mental status/agitation.  Was found via the police after he went up to them and attacked them.  Patient smoked of alcohol per the officers at bedside.  Apparently he has a history of being aggressive with drinking.  Patient was very aggressive with police has a spit mask over his face    With  at bedside patient is accusing people of stealing his money and needed to go to [facility].    GENERAL: very aggressive   HEENT: no nystagmus noted   LUNGS: non labored breathing   ABDOMEN non distended   EXT: No edema, no calf tenderness, no deformities.  NEURO: A&Ox1-2 Moving all extremities..  PSYCH:  aggressive     Given history and physical exam will need sedation and further workup given unclear ultimately/CT allergy but probable alcohol given patient smells of EtOH

## 2025-01-20 NOTE — ED PROVIDER NOTE - PROGRESS NOTE DETAILS
Randy ATTG   For the safety of the patient and for staff patient required sedation given he was very physical and spitting at staff. Randy ATTG   Patient began to calm down after receiving center half midazolam IM plan for blood work etiology of agitation assessment Randy ATTG pt now under arrest, still resting w/o any disturbances     pt under arrest for hitting officer Randy ATTG pt ambualting will dc pt medically cleared       Upon reassessment patient has some old bruising underlying the eye is patient denies having any changes to his vision and is nontender.  Patient states he fell many days ago.    Patient tolerated walking by himself plan for discharge and police custody

## 2025-01-20 NOTE — ED ADULT TRIAGE NOTE - CHIEF COMPLAINT QUOTE
PT BIBA for intox and aggressive behavior. PT approached [employer] became agitated kicked officer. PT handcuffed and spit guard in place. Aggressive in triage. yelling and attempting to jump from stretcher. Security and [employer] at bedside

## 2025-01-20 NOTE — ED PROVIDER NOTE - NSFOLLOWUPINSTRUCTIONS_ED_ALL_ED_FT
LO QUE NECESITA SABER:    ¿Qué es el abuso de alcohol?El abuso de alcohol significa que usted tatyana más de los límites diarios o semanales recomendados. Usted puede estar bebiendo alcohol regularmente o bebiendo grandes cantidades en un corto período (atracones de bebida). Usted sigue tomando, aunque esto le cause problemas legales, laborales o con mae relaciones.    ¿Qué necesito saber acerca de los límites recomendados de alcohol?Ashley bebida se define neeta 12 onzas (oz) de cerveza, 5 onzas de vino o 1.5 onzas de licor, neeta el whisky.    Los hombres de 21 a 64 añosdeberían limitar el consumo de alcohol a 2 tragos al día. No tome más de 4 bebidas por día o más de 14 en ashley semana.    Todos los hombres y las mujeres de 65 años o másdeberían limitar el consumo de alcohol a 1 trago por día. No tome más de 3 bebidas por día o más de 7 en ashley semana. No consuma bebidas alcohólicas si está embarazada.  ¿Cuáles son los signos y síntomas del abuso de alcohol?    Pérdida de interés en mae actividades, trabajo y labor escolar    Esconder alcohol o beber en privado    Depresión o sentimiento de culpa por beber    Pensamientos constantes acerca del alcohol    Beber por la mañana para aliviar los efectos de la resaca    No poder controlar la cantidad que se juantia    Inquietud, comportamiento errático y violento  ¿Qué problemas de jessica puede causar el abuso del alcohol?    Cáncer en hígado, páncreas, estómago, colon, riñón o mamas    Derrame cerebral o ataque cardíaco    Enfermedad hepática, renal o pulmonar    Desmayos, pérdida de memoria, daño cerebral o demencia    Diabetes, problemas del sistema inmunológico o deficiencia de tiamina (vitamina B1)    Problemas para usted y barriga bebé si tatyana cesar el embarazo  ¿Cómo se trata el abuso del alcohol?El tratamiento lo va a ayudar a comprender la razón por la cual usted abusa del alcohol. Los consejeros y terapeutas le van a proveer apoyo y lo van a ayudar a encontrar formas de afrontamiento en vez de davonte. Usted podría necesitar tratamiento con internación para proveerle un ambiente controlado. Usted podría necesitar tratamiento ambulatorio después de que barriga tratamiento hospitalario haya sido completado.    La desintoxicaciónes un programa utilizado para eliminar el alcohol de barriga cuerpo. Cesar la desintoxicación se administran medicamentos para ayudar a evitar los síntomas de abstinencia que se presentan cuando se suspende el consumo de alcohol.    La terapia de intervención brevelo ayuda a pensar de manera diferente sobre barriga consumo de alcohol. Un médico lo ayuda a fijar metas para disminuir barriga consumo de bebidas alcohólicas. La terapia puede continuar después de que sale del hospital.    Suplementos vitamínicos, neeta B1, pueden ser necesarios. El consumo de alcohol puede dificultar la capacidad del organismo de absorber suficiente vitamina B1. Es posible que le den vitamina B1 si barriga nivel es bajo. También se administra para prevenir el daño cerebral por consumo de alcohol.  ¿Qué puedo hacer para manejar mi abuso de alcohol?    Trabaje con los médicos en los objetivos para beber menos.[place] puede ayudar a prevenir problemas de jessica. Por ejemplo, puede empezar por planificar barriga consumo semanal de alcohol. Será más fácil davonte menos bebidas si planifica con antelación.    Cuando virgilio alcohol, acompáñelo con comida.La comida evitará que el alcohol entre en barriga sistema demasiado rápido. Coma antes de davonte barriga primera bebida alcohólica.    Calcule con cuidado el tiempo de mae bebidas.No tome más de ashley bebida por hora. [place] líquido, neeta agua, café o un refresco, entre las bebidas alcohólicas.    No maneje si ha tomado alcohol.Planifique con antelación para tener un viaje seguro a casa. Asegúrese de que alguien que no haya estado bebiendo lo pueda llevar a casa. Planifique el uso de un taxi u otro servicio de transporte, si lo necesita.    No virgilio alcohol si está tomando ju medicamento.El alcohol es peligroso cuando se combina con ciertos medicamentos, neeta acetaminofeno o un medicamento para la presión arterial. Hable con barriga médico acerca de todos los medicamentos que está tomando.  ¿Dónde puedo obtener apoyo y más información?    Alcoholics Anonymous  Web Address: http://www.aa.org  Substance Abuse and Mental Health Services Administration ([facility])  [address]  [place] [address]  Web Address: http://www.samhsa.gov or https://dpt2.samhsa.gov/treatment/  Llame al número local de emergencias (911 en los [place]), o pídale a alguien que llame si:    Tiene dolor en el pecho o dificultad para respirar de forma repentina.    Usted quiere lastimarse o lastimar a otros.    Usted sufre ashley convulsión.  ¿Cuándo liana buscar atención inmediatamente?    Usted no puede dejar de vomitar o vomita red.    ¿Cuándo liana llamar a mi médico?    Usted tiene alucinaciones (ve o escucha cosas que no son reales).    Usted tiene preguntas o inquietudes acerca de barriga condición o cuidado.  ACUERDOS SOBRE BARRIGA CUIDADO:    Usted tiene el derecho de ayudar a planear barriga cuidado. Aprenda todo lo que pueda sobre barriga condición y neeta darle tratamiento. Discuta mae opciones de tratamiento con mae médicos para decidir el cuidado que usted desea recibir. Usted siempre tiene el derecho de rechazar el tratamiento.

## 2025-01-21 VITALS
RESPIRATION RATE: 20 BRPM | SYSTOLIC BLOOD PRESSURE: 132 MMHG | DIASTOLIC BLOOD PRESSURE: 87 MMHG | HEART RATE: 99 BPM | OXYGEN SATURATION: 98 % | TEMPERATURE: 99 F

## 2025-01-21 PROCEDURE — 93005 ELECTROCARDIOGRAM TRACING: CPT

## 2025-01-21 PROCEDURE — 70450 CT HEAD/BRAIN W/O DYE: CPT | Mod: MC

## 2025-01-21 PROCEDURE — T1013: CPT

## 2025-01-21 PROCEDURE — 85025 COMPLETE CBC W/AUTO DIFF WBC: CPT

## 2025-01-21 PROCEDURE — 83605 ASSAY OF LACTIC ACID: CPT

## 2025-01-21 PROCEDURE — 96372 THER/PROPH/DIAG INJ SC/IM: CPT

## 2025-01-21 PROCEDURE — 99285 EMERGENCY DEPT VISIT HI MDM: CPT | Mod: 25

## 2025-01-21 PROCEDURE — 36415 COLL VENOUS BLD VENIPUNCTURE: CPT

## 2025-01-21 PROCEDURE — 80053 COMPREHEN METABOLIC PANEL: CPT

## 2025-01-23 DIAGNOSIS — R46.89 OTHER SYMPTOMS AND SIGNS INVOLVING APPEARANCE AND BEHAVIOR: ICD-10-CM

## 2025-01-23 DIAGNOSIS — R41.82 ALTERED MENTAL STATUS, UNSPECIFIED: ICD-10-CM

## 2025-01-23 DIAGNOSIS — S09.93XA UNSPECIFIED INJURY OF FACE, INITIAL ENCOUNTER: ICD-10-CM

## 2025-01-23 DIAGNOSIS — R45.1 RESTLESSNESS AND AGITATION: ICD-10-CM
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
## 2025-04-14 NOTE — ED ADULT NURSE NOTE - IN ACCORDANCE WITH [place] LAW, WE OFFER EVERY PATIENT A HEPATITIS C TEST. WOULD YOU LIKE TO BE TESTED TODAY?
- Blood pressure was elevated- Will increase amlodipine 10 mg daily - Goal for blood pressure <115/75 mmHg.   - Will do renal panel, cystatin c, vitamin D 25 OH level, PTH, CBC with diff, iron panel, retic count, and urine studies on 4/24/2025.  - Will see you in 3-4 months.    Unable to answer due to medical condition/unresponsive/etc...